# Patient Record
Sex: MALE | Race: WHITE | URBAN - METROPOLITAN AREA
[De-identification: names, ages, dates, MRNs, and addresses within clinical notes are randomized per-mention and may not be internally consistent; named-entity substitution may affect disease eponyms.]

---

## 2018-10-24 ENCOUNTER — TRANSFERRED RECORDS (OUTPATIENT)
Dept: HEALTH INFORMATION MANAGEMENT | Facility: CLINIC | Age: 43
End: 2018-10-24

## 2018-10-25 ENCOUNTER — PRE VISIT (OUTPATIENT)
Dept: UROLOGY | Facility: CLINIC | Age: 43
End: 2018-10-25

## 2018-10-27 ENCOUNTER — PRE VISIT (OUTPATIENT)
Dept: UROLOGY | Facility: CLINIC | Age: 43
End: 2018-10-27

## 2018-10-27 NOTE — TELEPHONE ENCOUNTER
Patient with history of penile candidiasis coming in for consult. Patient chart reviewed, no need for call, all records available and ready for appointment.

## 2018-11-01 ENCOUNTER — OFFICE VISIT (OUTPATIENT)
Dept: UROLOGY | Facility: CLINIC | Age: 43
End: 2018-11-01
Payer: COMMERCIAL

## 2018-11-01 VITALS
WEIGHT: 291.5 LBS | DIASTOLIC BLOOD PRESSURE: 100 MMHG | BODY MASS INDEX: 34.42 KG/M2 | SYSTOLIC BLOOD PRESSURE: 146 MMHG | HEART RATE: 67 BPM | HEIGHT: 77 IN

## 2018-11-01 DIAGNOSIS — N48.1 BALANITIS: Primary | ICD-10-CM

## 2018-11-01 RX ORDER — AMLODIPINE BESYLATE 5 MG/1
TABLET ORAL
Refills: 3 | COMMUNITY
Start: 2018-08-31

## 2018-11-01 ASSESSMENT — PAIN SCALES - GENERAL: PAINLEVEL: NO PAIN (0)

## 2018-11-01 NOTE — MR AVS SNAPSHOT
"              After Visit Summary   2018    Kate Luther    MRN: [id number]           Patient Information     Date Of Birth          1975        Visit Information        Provider Department      2018 11:40 AM Vish Lujan MD [facility] and [facility]        Today's Diagnoses     Balanitis    -  1       Follow-ups after your visit        Who to contact     Please call your clinic at [phone] to:    Ask questions about your health    Make or cancel appointments    Discuss your medicines    Learn about your test results    Speak to your doctor            Additional Information About Your Visit        MyChart Information     [employer] is an electronic gateway that provides easy, online access to your medical records. With [employer], you can request a clinic appointment, read your test results, renew a prescription or communicate with your care team.     To sign up for [employer] visit the website at [email or web address]   You will be asked to enter the access code listed below, as well as some personal information. Please follow the directions to create your username and password.     Your access code is: [email or web address]  Expires: 2019  5:30 AM     Your access code will  in 90 days. If you need help or a new code, please contact your [facility] or call [phone] for assistance.        Care EveryWhere ID     This is your Care EveryWhere ID. This could be used by other organizations to access your [place] medical records  [id number]        Your Vitals Were     Pulse Height BMI (Body Mass Index)             67 1.943 m (6' 4.5\") 35.02 kg/m2          Blood Pressure from Last 3 Encounters:   18 (!) 146/100    Weight from Last 3 Encounters:   18 132.2 kg (291 lb 8 oz)              Today, you had the following     No orders found for display       Primary Care Provider    Daren Juárez       No " address on file        Equal Access to Services     MISSY BRIDGER : Hadii herbert nicolas gage Patel, wachastity luradha, horace nieveslexyartem underwoodsumaartem, daysi charlene brannonjaredwendy dunham. So [facility] [phone].    ATENCIÓN: Si habla español, tiene a ramirez disposición servicios gratuitos de asistencia lingüística. Llame al [phone].    We comply with applicable federal civil rights laws and [place] laws. We do not discriminate on the basis of race, color, national origin, age, disability, sex, sexual orientation, or gender identity.            Thank you!     Thank you for choosing [facility] UROLOGY AND [facility] FOR PROSTATE [facility] UROLOGIC CANCERS  for your care. Our goal is always to provide you with excellent care. Hearing back from our patients is one way we can continue to improve our services. Please take a few minutes to complete the written survey that you may receive in the mail after your visit with us. Thank you!             Your Updated Medication List - Protect others around you: Learn how to safely use, store and throw away your medicines at www.disposemymeds.org.          This list is accurate as of 11/1/18 11:59 PM.  Always use your most recent med list.                   Brand Name Dispense Instructions for use Diagnosis    amLODIPine 5 MG tablet    NORVASC     TK 1 T PO QD

## 2018-11-01 NOTE — PROGRESS NOTES
"We are pleased to see Mr. Kate Luther in consultation at the request of Damien Serrano for the evaluation of chief complaint listed below    Chief Complaint:    Possible yeast infection         History of Present Illness:   Kate Luther is a(n) 43 year old male w/ no significant PMH and no previous urologic history who presents with recurrent  yeast infection of the glans. He has been struggling with this for multiple years. He has tried an antifungal cream, an antifungal pill, and an antibiotic. Only the antibiotic pill helped at all.   His first yeast infection was a couple of years ago. This has been constant with some minor flaring better and worse. Right now it is better. He never has any pain when he has an infection. No problems urinating.   He is drinking a lot of kombucha and eating fermented foods to help control the infection. He feel like this is helping. He has been referred to ID but has not made an appt.            Past Medical History:     Past Medical History:   Diagnosis Date     HTN (hypertension)           Past Surgical History:     Past Surgical History:   Procedure Laterality Date     HC TOOTH EXTRACTION W/FORCEP            Social History:   Works as a postman.        Smoking: former smoker. Quit over 5 years ago.   Alcohol: None  IV Drug Use: None         Family History:   History reviewed. No pertinent family history.  No urologic cancers in the family.         Allergies:   No Known Allergies         Medications:     Current Outpatient Prescriptions   Medication Sig     amLODIPine (NORVASC) 5 MG tablet TK 1 T PO QD     No current facility-administered medications for this visit.           REVIEW OF SYSTEMS:    See HPI for pertinent details.  Remainder of 10-point ROS negative.         PHYSICAL EXAM   BP (!) 146/100  Pulse 67  Ht 1.943 m (6' 4.5\")  Wt 132.2 kg (291 lb 8 oz)  BMI 35.02 kg/m2  GENERAL: No acute distress. Well nourished.   HEENT:  Sclerae anicteric.  " Conjunctivae pink.  Moist mucous membranes.  NECK:  Supple.  No lymphadenopathy.  CARDIAC:  Regular rate and rhythm.  LUNGS:  Non-labored breathing  BACK:  No costovertebral tenderness.  ABDOMEN: Soft, non-tender, no surgical scars, no organomegaly, non-tender.  :  Phallus circumcised, meatus adequate, no plaques palpated. No evidence of yeast infection. No erythema or swelling of glans. Testes descended bilaterally, no intratesticular masses.  Epididymes non-tender.  No varicoceles or inguinal hernias.  SKIN: No rashes.  Dry.     EXTREMITIES:  Warm, well perfused.  No lower extremity edema bilaterally.  NEURO: normal gait, no focal deficits.           LABS AND IMAGING:   None          ASSESSMENT:   Kate Luther is a 43 year old male who presents for evaluation of possible yeast infection. Exam normal.           PLAN:     Advised clotrimazole 1% cream PRN    RTC PRN    Dayanna Jarvis MD  Urology PGY2    Patient was seen and examined with Dr Lujan    I saw and examined the patient with the resident today.  I agree with the resident note and plan of care as above.  Penis looks normal today.  Advised topical clotrimazole cream PRN.    Vish Lujan MD  Urology Staff

## 2018-11-01 NOTE — LETTER
11/1/2018     RE: Kate Luther  [address]  [place] [id number]     Dear Colleague,    Thank you for referring your patient, Kate Luther, to the [facility] UROLOGY AND INST FOR PROSTATE AND UROLOGIC CANCERS at [facility]. Please see a copy of my visit note below.    We are pleased to see . Kaet Luther in consultation at the request of Damien Serrano for the evaluation of chief complaint listed below    Chief Complaint:    Possible yeast infection         History of Present Illness:   Kate Luther is a(n) 43 year old male w/ no significant PMH and no previous urologic history who presents with recurrent  yeast infection of the glans. He has been struggling with this for multiple years. He has tried an antifungal cream, an antifungal pill, and an antibiotic. Only the antibiotic pill helped at all.   His first yeast infection was a couple of years ago. This has been constant with some minor flaring better and worse. Right now it is better. He never has any pain when he has an infection. No problems urinating.   He is drinking a lot of kombucha and eating fermented foods to help control the infection. He feel like this is helping. He has been referred to ID but has not made an appt.            Past Medical History:     Past Medical History:   Diagnosis Date     HTN (hypertension)           Past Surgical History:     Past Surgical History:   Procedure Laterality Date     HC TOOTH EXTRACTION W/FORCEP            Social History:   Works as a postman.        Smoking: former smoker. Quit over 5 years ago.   Alcohol: None  IV Drug Use: None         Family History:   History reviewed. No pertinent family history.  No urologic cancers in the family.         Allergies:   No Known Allergies         Medications:     Current Outpatient Prescriptions   Medication Sig     amLODIPine (NORVASC) 5 MG tablet TK 1 T PO QD     No current  "facility-administered medications for this visit.           REVIEW OF SYSTEMS:    See HPI for pertinent details.  Remainder of 10-point ROS negative.         PHYSICAL EXAM   BP (!) 146/100  Pulse 67  Ht 1.943 m (6' 4.5\")  Wt 132.2 kg (291 lb 8 oz)  BMI 35.02 kg/m2  GENERAL: No acute distress. Well nourished.   HEENT:  Sclerae anicteric.  Conjunctivae pink.  Moist mucous membranes.  NECK:  Supple.  No lymphadenopathy.  CARDIAC:  Regular rate and rhythm.  LUNGS:  Non-labored breathing  BACK:  No costovertebral tenderness.  ABDOMEN: Soft, non-tender, no surgical scars, no organomegaly, non-tender.  :  Phallus circumcised, meatus adequate, no plaques palpated. No evidence of yeast infection. No erythema or swelling of glans. Testes descended bilaterally, no intratesticular masses.  Epididymes non-tender.  No varicoceles or inguinal hernias.  SKIN: No rashes.  Dry.     EXTREMITIES:  Warm, well perfused.  No lower extremity edema bilaterally.  NEURO: normal gait, no focal deficits.           LABS AND IMAGING:   None          ASSESSMENT:   Kate Luther is a 43 year old male who presents for evaluation of possible yeast infection. Exam normal.           PLAN:     Advised clotrimazole 1% cream PRN    RTC PRN    Dayanna Jarvis MD  Urology PGY2    Patient was seen and examined with Dr Lujan    I saw and examined the patient with the resident today.  I agree with the resident note and plan of care as above.  Penis looks normal today.  Advised topical clotrimazole cream PRN.    Vish Lujan MD  Urology Staff     "

## 2019-12-12 NOTE — NURSING NOTE
"Chief Complaint   Patient presents with     Consult     chronic penile candidiasis       Blood pressure (!) 146/100, pulse 67, height 1.943 m (6' 4.5\"), weight 132.2 kg (291 lb 8 oz). Body mass index is 35.02 kg/(m^2).    There is no problem list on file for this patient.      No Known Allergies    Current Outpatient Prescriptions   Medication Sig Dispense Refill     amLODIPine (NORVASC) 5 MG tablet TK 1 T PO QD  3       Social History   Substance Use Topics     Smoking status: Former Smoker     Smokeless tobacco: Never Used     Alcohol use Not on file       Amelia Yeboah LPN  11/1/2018  11:43 AM      " none

## 2021-05-31 ENCOUNTER — RECORDS - HEALTHEAST (OUTPATIENT)
Dept: ADMINISTRATIVE | Facility: CLINIC | Age: 46
End: 2021-05-31